# Patient Record
(demographics unavailable — no encounter records)

---

## 2024-10-17 NOTE — PHYSICAL EXAM
[Abdomen Tenderness] : ~T No ~M abdominal tenderness [JVD] : no jugular venous distention  [Normal Breath Sounds] : Normal breath sounds [No Rash or Lesion] : No rash or lesion [Alert] : alert [Calm] : calm [de-identified] : Well appearing male in NAD [de-identified] : MMM [de-identified] : ROM WNL [FreeTextEntry1] : The pt was examined in the left lateral decubitus position with a medical assistant present for the entirety of the examination. Visual examination of the anal verge with effacement of the buttocks revealed a well healed external RP scar at the AV and soft LL external pile without active thrombosis.  Otherwise no masses, ulcerations, fissures or skin rashes. GC/Chlamydia swab performed. Digital rectal exam revealed no palpable mass or blood with sphincter tone within normal limits. Appropriate anal squeeze and digital expulsion. A lubricated anoscope was then inserted revealing healthy appearing distal rectal mucosa and anoderm and a completely healed internal wound in the RP quadrant as well as moderately engorged, noninflamed internal hemorrhoids.  The patient tolerated the exam well.

## 2024-10-17 NOTE — PLAN
[TextEntry] : - I discussed with the patient the risks, benefits and alternatives to surgical management of symptomatic internal/external hemorrhoids and the patient wishes to proceed with a trial of RBL. Will schedule once he returns from his trip. - Will send Anusol supp to be used PRN while travelling abroad if symptoms flare.

## 2024-10-17 NOTE — HISTORY OF PRESENT ILLNESS
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 Recovered well from the fistulotomy however after recent travel developed exacerbation of his internal hemohrroids. He was last seen one month prior and was recommended supportive care. Today he presents for interval follow up and evaluation for possible RBL. He reports despite optimal supportive care and BMs he continues to have hemorrhoidal discomfort.  PMH: GERD, Anxiety Meds: Truvada (prep), Sertraline NKDA PSH: [place] teeth extraction FH: Denies CRC/IBD Cscope: Never, however had an EGD in college when there was concern for PUD which ultimately revealed evidence of GERD.

## 2024-11-07 NOTE — PHYSICAL EXAM
[Abdomen Tenderness] : ~T No ~M abdominal tenderness [JVD] : no jugular venous distention  [Normal Breath Sounds] : Normal breath sounds [No Rash or Lesion] : No rash or lesion [Alert] : alert [Calm] : calm [de-identified] : Well appearing male in NAD [de-identified] : MMM [de-identified] : ROM WNL [FreeTextEntry1] : The pt was examined in the left lateral decubitus position with a medical assistant present for the entirety of the examination. Visual examination of the anal verge with effacement of the buttocks revealed a well healed external RP scar at the AV and soft LL external pile without active thrombosis.  Otherwise no masses, ulcerations, fissures or skin rashes. Digital rectal exam revealed no palpable mass or blood with sphincter tone within normal limits. Appropriate anal squeeze and digital expulsion. A lubricated anoscope was then inserted revealing healthy appearing distal rectal mucosa and anoderm and a completely healed internal wound in the RP quadrant as well as grade II/III LL internal hemorrhoids.  The patient tolerated the exam well. I discussed with the patient the risks, benefits and alternatives to surgical management of symptomatic internal hemorrhoids and the patient wished to proceed with a trial of rubber band ligation. As such, with the anoscope in place the LL hemorrhoidal column was isolated and a rubber band successfully deployed proximal to the dentate line without difficulty.

## 2024-11-07 NOTE — PHYSICAL EXAM
[Abdomen Tenderness] : ~T No ~M abdominal tenderness [JVD] : no jugular venous distention  [Normal Breath Sounds] : Normal breath sounds [No Rash or Lesion] : No rash or lesion [Alert] : alert [Calm] : calm [de-identified] : Well appearing male in NAD [de-identified] : MMM [de-identified] : ROM WNL [FreeTextEntry1] : The pt was examined in the left lateral decubitus position with a medical assistant present for the entirety of the examination. Visual examination of the anal verge with effacement of the buttocks revealed a well healed external RP scar at the AV and soft LL external pile without active thrombosis.  Otherwise no masses, ulcerations, fissures or skin rashes. Digital rectal exam revealed no palpable mass or blood with sphincter tone within normal limits. Appropriate anal squeeze and digital expulsion. A lubricated anoscope was then inserted revealing healthy appearing distal rectal mucosa and anoderm and a completely healed internal wound in the RP quadrant as well as grade II/III LL internal hemorrhoids.  The patient tolerated the exam well. I discussed with the patient the risks, benefits and alternatives to surgical management of symptomatic internal hemorrhoids and the patient wished to proceed with a trial of rubber band ligation. As such, with the anoscope in place the LL hemorrhoidal column was isolated and a rubber band successfully deployed proximal to the dentate line without difficulty.

## 2024-11-07 NOTE — HISTORY OF PRESENT ILLNESS
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 Recovered well from the fistulotomy however after recent travel developed exacerbation of his internal hemorrhoids. He was last seen 10/17 and recommended a trial of RBL. Today he presents for follow up and possible banding. He reports flaring of the symptoms while away, predominantly on the Left.  PMH: GERD, Anxiety Meds: Truvada (prep), Sertraline NKDA PSH: [place] teeth extraction FH: Denies CRC/IBD Cscope: Never, however had an EGD in college when there was concern for PUD which ultimately revealed evidence of GERD.

## 2024-11-07 NOTE — HISTORY OF PRESENT ILLNESS
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 Recovered well from the fistulotomy however after recent travel developed exacerbation of his internal hemorrhoids. He was last seen 10/17 and recommended a trial of RBL. Today he presents for follow up and possible banding. He reports flaring of the symptoms while away, predominantly on the Left.  PMH: GERD, Anxiety Meds: Truvada (prep), Sertraline NKDA PSH: [place] teeth extraction FH: Denies CRC/IBD Cscope: Never, however had an EGD in college when there was concern for PUD which ultimately revealed evidence of GERD.

## 2024-12-03 NOTE — PLAN
[TextEntry] : - Continue bulking fiber laxative in addition to probiotic while on abx. - RTC for follow up in 2-3 months.

## 2024-12-03 NOTE — PLAN
[TextEntry] : - Continue bulking fiber laxative in addition to probiotic while on abx. - RTC for follow up in 2-3 months. none

## 2024-12-03 NOTE — ASSESSMENT
[FreeTextEntry1] : 31M with symptomatic internal/external hemorrhoids s/p RBL with improved symptomatology now with mucous leakage after first BM of the day likely due to change in BM after abx use.

## 2024-12-03 NOTE — HISTORY OF PRESENT ILLNESS
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 Recovered well from the fistulotomy however after recent travel developed exacerbation of his internal hemorrhoids. Seen 10/17 and recommended a trial of RBL. Last seen 11/07 s/p RBL of the LL internal hemorrhoidal column. Presents today for follow up. Reports symptoms has improved but now feels as though he is unable to perform proper perineal hygiene. States will have a full BM in the morning but will see slight soilage in the afternoon after wiping. BM has been on the looser since starting antibiotics for skin infection.   PMH: GERD, Anxiety Meds: Truvada (prep), Sertraline NKDA PSH: [place] teeth extraction FH: Denies CRC/IBD Cscope: Never, however had an EGD in college when there was concern for PUD which ultimately revealed evidence of GERD.

## 2024-12-03 NOTE — PHYSICAL EXAM
[Abdomen Tenderness] : ~T No ~M abdominal tenderness [JVD] : no jugular venous distention  [Normal Breath Sounds] : Normal breath sounds [No Rash or Lesion] : No rash or lesion [Alert] : alert [Calm] : calm [de-identified] : Well appearing male in NAD [de-identified] : MMM [de-identified] : ROM WNL [FreeTextEntry1] : The pt was examined in the prone cecilio knife position with a medical assistant present for the entirety of the examination. Visual examination of the anal verge with effacement of the buttocks revealed a well healed external RP scar at the AV.  Otherwise no masses, ulcerations, fissures or skin rashes. Digital rectal exam revealed no palpable mass or blood with sphincter tone within normal limits. Appropriate anal squeeze and digital expulsion. A lubricated anoscope was then inserted revealing healthy appearing distal rectal mucosa and anoderm and a completely healed internal wound in the RP quadrant as well as grade I/II internal hemorrhoids.  The patient tolerated the exam well.

## 2024-12-03 NOTE — HISTORY OF PRESENT ILLNESS
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 Recovered well from the fistulotomy however after recent travel developed exacerbation of his internal hemorrhoids. Seen 10/17 and recommended a trial of RBL. Last seen 11/07 s/p RBL of the LL internal hemorrhoidal column. Presents today for follow up. Reports symptoms has improved but now feels as though he is unable to perform proper perineal hygiene. States will have a full BM in the morning but will see slight soilage in the afternoon after wiping. BM has been on the looser since starting antibiotics for skin infection.   PMH: GERD, Anxiety Meds: Truvada (prep), Sertraline NKDA PSH: [place] teeth extraction FH: Denies CRC/IBD Cscope: Never, however had an EGD in college when there was concern for PUD which ultimately revealed evidence of GERD.

## 2024-12-03 NOTE — PHYSICAL EXAM
[Abdomen Tenderness] : ~T No ~M abdominal tenderness [JVD] : no jugular venous distention  [Normal Breath Sounds] : Normal breath sounds [No Rash or Lesion] : No rash or lesion [Alert] : alert [Calm] : calm [de-identified] : Well appearing male in NAD [de-identified] : MMM [de-identified] : ROM WNL [FreeTextEntry1] : The pt was examined in the prone cecilio knife position with a medical assistant present for the entirety of the examination. Visual examination of the anal verge with effacement of the buttocks revealed a well healed external RP scar at the AV.  Otherwise no masses, ulcerations, fissures or skin rashes. Digital rectal exam revealed no palpable mass or blood with sphincter tone within normal limits. Appropriate anal squeeze and digital expulsion. A lubricated anoscope was then inserted revealing healthy appearing distal rectal mucosa and anoderm and a completely healed internal wound in the RP quadrant as well as grade I/II internal hemorrhoids.  The patient tolerated the exam well.

## 2025-01-03 NOTE — HISTORY OF PRESENT ILLNESS
[FreeTextEntry1] : 31-year-old male presents with post-void dribbling and penile irritation since September 2024.   He had 2 rectal procedures for fistula and then hemorrhoid banding procedure. After this noticed increased dribbling of urine after urination which is bothersome. Also having more frequency but may be due to increased water intake. Denies dysuria but feels irritation to the tip of the penis. He denies hematuria. He had urine testing which was normal in November. He had a prostate exam that was normal.   FH prostate cancer: Grandpa and 3 Uncles

## 2025-01-03 NOTE — ASSESSMENT
[FreeTextEntry1] : 31-year-old male with hx anal fistula repair and internal and external hemorrhoids presents with post-void dribbling and penile irritation.   Discussed patient likely has a component of pelvic floor dysfunction which is consistent with history of colorectal issues as well and would likely benefit from PFPT. Patient would like to try. Discussed urethral milking. Will send urine to r/o STI and UCx.   - F/u UA, UCx, GC/CL, ureaplasma, trichomonas - PFPT - RTC 3 months

## 2025-01-03 NOTE — PHYSICAL EXAM
[General Appearance - In No Acute Distress] : no acute distress [Heart Rate And Rhythm] : heart rate and rhythm were normal [Normal Appearance] : normal appearance [Well Groomed] : well groomed [Edema] : no peripheral edema [Respiration, Rhythm And Depth] : normal respiratory rhythm and effort [Abdomen Soft] : soft [Exaggerated Use Of Accessory Muscles For Inspiration] : no accessory muscle use [Abdomen Tenderness] : non-tender [Costovertebral Angle Tenderness] : no ~M costovertebral angle tenderness [Urethral Meatus] : meatus normal [Testes Tenderness] : no tenderness of the testes [Testes Mass (___cm)] : there were no testicular masses [Normal Station and Gait] : the gait and station were normal for the patient's age [] : no rash [No Focal Deficits] : no focal deficits [Oriented To Time, Place, And Person] : oriented to person, place, and time [Affect] : the affect was normal [Mood] : the mood was normal

## 2025-01-03 NOTE — LETTER BODY
[Dear  ___] : Dear  [unfilled], [Courtesy Letter:] : I had the pleasure of seeing your patient, [unfilled], in my office today. [Please see my note below.] : Please see my note below. [Consult Closing:] : Thank you very much for allowing me to participate in the care of this patient.  If you have any questions, please do not hesitate to contact me. [Sincerely,] : Sincerely, [FreeTextEntry3] : Donta Rodriguez MD

## 2025-01-14 NOTE — ASSESSMENT
[FreeTextEntry1] : 31M with hx of RP fistula s/p primary fistulotomy and prior symptomatic internal/external hemorrhoids currently with discomfort and streaked blood of unclear etiology. Small anterior wound treated today with silver nitrate and without tracking to suggest fistula and not a typical appearance of a fissure.  4

## 2025-01-14 NOTE — PHYSICAL EXAM
[Abdomen Tenderness] : ~T No ~M abdominal tenderness [JVD] : no jugular venous distention  [Normal Breath Sounds] : Normal breath sounds [No Rash or Lesion] : No rash or lesion [Alert] : alert [Calm] : calm [de-identified] : Well appearing male in NAD [de-identified] : MMM [de-identified] : ROM WNL [FreeTextEntry1] : The pt was examined in the left lateral decubitus position with a medical assistant present for the entirety of the examination. Visual examination of the anal verge with effacement of the buttocks revealed a well healed external RP scar at the AV.  Also a 1mm LA anal verge punctate wound. The wound was probed with a fistulotomy probe and did not track anywhere. Applied silver nitrate to the wound. Otherwise no masses, ulcerations, fissures or skin rashes. Digital rectal exam revealed no palpable mass or blood with sphincter tone within normal limits. Appropriate anal squeeze and digital expulsion. A lubricated anoscope was then inserted revealing healthy appearing distal rectal mucosa and anoderm and a completely healed internal wound in the RP quadrant as well as grade I/II internal hemorrhoids.  The patient tolerated the exam well.

## 2025-01-14 NOTE — PHYSICAL EXAM
[Abdomen Tenderness] : ~T No ~M abdominal tenderness [JVD] : no jugular venous distention  [Normal Breath Sounds] : Normal breath sounds [No Rash or Lesion] : No rash or lesion [Alert] : alert [Calm] : calm [de-identified] : Well appearing male in NAD [de-identified] : MMM [de-identified] : ROM WNL [FreeTextEntry1] : The pt was examined in the left lateral decubitus position with a medical assistant present for the entirety of the examination. Visual examination of the anal verge with effacement of the buttocks revealed a well healed external RP scar at the AV.  Also a 1mm LA anal verge punctate wound. The wound was probed with a fistulotomy probe and did not track anywhere. Applied silver nitrate to the wound. Otherwise no masses, ulcerations, fissures or skin rashes. Digital rectal exam revealed no palpable mass or blood with sphincter tone within normal limits. Appropriate anal squeeze and digital expulsion. A lubricated anoscope was then inserted revealing healthy appearing distal rectal mucosa and anoderm and a completely healed internal wound in the RP quadrant as well as grade I/II internal hemorrhoids.  The patient tolerated the exam well.

## 2025-01-14 NOTE — ASSESSMENT
[FreeTextEntry1] : 31M with hx of RP fistula s/p primary fistulotomy and prior symptomatic internal/external hemorrhoids currently with discomfort and streaked blood of unclear etiology. Small anterior wound treated today with silver nitrate and without tracking to suggest fistula and not a typical appearance of a fissure.

## 2025-01-14 NOTE — HISTORY OF PRESENT ILLNESS
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 Recovered well from the fistulotomy however after recent travel developed exacerbation of his internal hemorrhoids. Last seen early December for interval follow up. s/p RBL with improved symptomatology but now feel as though he is unable to perform proper perineal hygiene. BM has been on the looser since starting antibiotics for skin infection. Now with mucous leakage after first BM of the day likely due to change in BM after abx use. Advised continue bulking fiber laxative in addition to probiotic while on antibiotics. Patient presents today for interval follow up. Has been on probiotics and increased fiber intake. Has been doing well until about two weeks ago when he developed constipation during the holidays. Reports an increased swelling of lump near the anal opening with rectal discomfort. Has been applying desitin cream and performing sitz bath. BM is now regulated.   PMH: GERD, Anxiety Meds: Truvada (prep), Sertraline NKDA PSH: [place] teeth extraction FH: Denies CRC/IBD Cscope: Never, however had an EGD in college when there was concern for PUD which ultimately revealed evidence of GERD.

## 2025-01-14 NOTE — HISTORY OF PRESENT ILLNESS
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 Recovered well from the fistulotomy however after recent travel developed exacerbation of his internal hemorrhoids. Last seen early December for interval follow up. s/p RBL with improved symptomatology but now feel as though he is unable to perform proper perineal hygiene. BM has been on the looser since starting antibiotics for skin infection. Now with mucous leakage after first BM of the day likely due to change in BM after abx use. Advised continue bulking fiber laxative in addition to probiotic while on antibiotics. Patient presents today for interval follow up. Has been on probiotics and increased fiber intake. Has been doing well until about two weeks ago when he developed constipation during the holidays. Reports an increased swelling of lump near the anal opening with rectal discomfort. Has been applying desitin cream and performing sitz bath. BM is now regulated.   PMH: GERD, Anxiety Meds: Truvada (prep), Sertraline NKDA PSH: [place] teeth extraction FH: Denies CRC/IBD Cscope: Never, however had an EGD in college when there was concern for PUD which ultimately revealed evidence of GERD.

## 2025-01-28 NOTE — ASSESSMENT
[FreeTextEntry1] : 32M with hx of RP fistula s/p primary fistulotomy and prior symptomatic internal/external hemorrhoids recently with a small anal verge wound concerning for possible new or recurrent fistula, treated topically now with resolution.

## 2025-01-28 NOTE — HISTORY OF PRESENT ILLNESS
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 Seen 12/2024 s/p RBL RPQ.  Seen two week prior with 1mm LA anal verge punctate wound, treated with silver nitrate. Recommended barrier ointment. Patient returns today, has bene using Vaseline daily and feel wound as completely closed. He has not had pain or drainage. Of note, two weeks ago he developed a prolapsing internal hemorrhoid, he has since increased stool softeners and fiber supplementation and symptoms are manageable at this time.   PMH: GERD, Anxiety Meds: Truvada (prep), Sertraline NKDA PSH: [place] teeth extraction FH: Denies CRC/IBD Cscope: Never, however had an EGD in college when there was concern for PUD which ultimately revealed evidence of GERD.

## 2025-01-28 NOTE — PHYSICAL EXAM
[Abdomen Tenderness] : ~T No ~M abdominal tenderness [JVD] : no jugular venous distention  [Normal Breath Sounds] : Normal breath sounds [No Rash or Lesion] : No rash or lesion [Alert] : alert [Calm] : calm [de-identified] : Well appearing male in NAD [de-identified] : MMM [de-identified] : ROM WNL [FreeTextEntry1] : The pt was examined in the left lateral decubitus position with a medical assistant present for the entirety of the examination. Visual examination of the anal verge with effacement of the buttocks revealed a well healed external RP scar at the AV.  Also a 1mm LA anal verge well healed scar where the prior wound had been noted. Otherwise no masses, ulcerations, fissures or skin rashes.

## 2025-03-12 NOTE — CONSULT LETTER
[Dear  ___] : Dear  [unfilled], [Consult Letter:] : I had the pleasure of evaluating your patient, [unfilled]. [Please see my note below.] : Please see my note below. [Consult Closing:] : Thank you very much for allowing me to participate in the care of this patient.  If you have any questions, please do not hesitate to contact me. [Sincerely,] : Sincerely, [FreeTextEntry3] : Nimisha Marques MD [facility] at [facility] Otolaryngology  Head & Neck Surgery [facility]   Department of Otolaryngology [facility] [employer] [facility] at [facility]  Office Tel: [phone]

## 2025-03-12 NOTE — HISTORY OF PRESENT ILLNESS
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 Seen in interim with sympomatic hemorrhoids s/p RBL RAQ. Last seen in January with with LA anal verge punctate wound concerning for new or recurrent fistula, treated with silver nitrate and now with resolution. He returns today concerned about new anal pain. He was at pelvic floor PT on Thursday and says that while the PT was removing her finger from his rectum, he felt a sharp pain and had some bleeding afterwards. Since then, he has had R anal pain and occasional bleeding. Has not tried any topical therapies.  PMH: GERD, Anxiety Meds: Truvada (prep), Sertraline NKDA PSH: [place] teeth extraction FH: Denies CRC/IBD Cscope: Never, however had an EGD in college when there was concern for PUD which ultimately revealed evidence of GERD.

## 2025-03-12 NOTE — ASSESSMENT
[FreeTextEntry1] : He has lingering symptoms following an upper respiratory tract infection.  There is no obvious sinus infection on exam.  He does have areas of recent bleeding on the anterior nasal septum bilaterally.  The septum was deviated.  He also suffers from reflux.  His symptoms are likely due to a resolving infection and reflux exacerbation.  Throat culture was sent to rule out bacterial infection.  He may also suffer from allergies  Plan -Findings and management options were discussed with the patient. - Humidifier - Nasal saline rinses and moisturizing nasal gel as needed - Continue Flonase although he may stop it for few days if there is bleeding - He should avoid picking at crusting in the nose - Antihistamine as needed - He is asked to call for the culture results.  I will place him on antibiotics if it is positive - Reflux precautions recommended.  He was given literature - Pepcid 20 mg twice daily - Consider further workup such as allergy evaluation depending on how he does - I have asked him to call me if he continues to have nosebleeds.  We will cauterize the septum if needed -avoid smoking and vaping - Follow-up in 2 to 3 weeks - Call and return earlier if any problems or worsening symptoms

## 2025-03-12 NOTE — PHYSICAL EXAM
[Abdomen Tenderness] : ~T No ~M abdominal tenderness [JVD] : no jugular venous distention  [Normal Breath Sounds] : Normal breath sounds [No Rash or Lesion] : No rash or lesion [Alert] : alert [Calm] : calm [de-identified] : Well appearing male in NAD [de-identified] : MMM [de-identified] : ROM WNL [FreeTextEntry1] : The pt was examined in the prone cecilio-knife position with a medical assistant present for the entirety of the examination. Visual examination of the anal verge with effacement of the buttocks revealed well healed scars in the RP and LA anal verge otherwise no masses, ulcerations, fissures or skin rashes. Digital rectal exam revealed no palpable mass or blood with sphincter tone within normal limits. A lubricated anoscope was then inserted revealing healthy appearing distal rectal mucosa and anoderm with appropriately sized, noninflamed internal hemorrhoids.  The patient tolerated the exam well.

## 2025-03-12 NOTE — HISTORY OF PRESENT ILLNESS
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 Seen in interim with sympomatic hemorrhoids s/p RBL RAQ. Last seen in January with with LA anal verge punctate wound concerning for new or recurrent fistula, treated with silver nitrate and now with resolution. He returns today concerned about new anal pain. He was at pelvic floor PT on Thursday and says that while the PT was removing her finger from his rectum, he felt a sharp pain and had some bleeding afterwards. Since then, he has had R anal pain and occasional bleeding. Has not tried any topical therapies.  PMH: GERD, Anxiety Meds: Truvada (prep), Sertraline NKDA PSH: [place] teeth extraction FH: Denies CRC/IBD Cscope: Never, however had an EGD in college when there was concern for PUD which ultimately revealed evidence of GERD.

## 2025-03-12 NOTE — PHYSICAL EXAM
[TextEntry] : PHYSICAL EXAM  General: The patient was alert, oriented and in no distress. Voice was clear.  Face: The patient had no facial asymmetry or mass. The skin was unremarkable.  Ears: Hearing normal to conversational voice External ears were normal without deformity. Ear canals were clear. No cerumen or inflammation Tympanic membranes were intact and normal. No perforation or effusion. mobile  Nose:  The external nose had no significant deformity.  No facial tenderness . On anterior rhinoscopy, the nasal mucosa was little dry anteriorly.  The anterior septum was grossly midline with dried blood on the anterior septum. There were no visualized polyps, purulence  or masses.   Oral cavity: Oral mucosa- normal. Oral and base of tongue- clear and without mass. Gingival and buccal mucosa- moist and without lesions. Palate- the palate moved well. There was no cleft palate. There appeared to be good salivary flow.   Oral cavity/oropharynx- no pus, erythema or mass 2-3+ tonsils.  No erythema, exudate, or abscess.  Culture taken  Neck:  The neck was symmetrical. The parotid and submandibular glands were normal without masses. The trachea was midline and there was no unusual crepitus. Thyroid was smooth and nontender and no masses were palpated. No masses  Lymphatics: Cervical adenopathy- none.    PROCEDURE:  FLEXIBLE LARYNGOSCOPY, NASAL ENDOSCOPY   Surgeon: Dr. Marques Indication: Chronic rhinitis, bloody mucus, recent upper respiratory tract infection, assess for sinusitis, inadequate exam on anterior rhinoscopy Anesthetic: Topical lidocaine and Afrin Procedure: The patient was placed in a sitting position.  Following application of the topical anesthetic and decongestant, exam was performed with a flexible telescope.  The scope was passed along the right nasal floor to the nasopharynx.  It was then passed into the region of the middle meatus, middle turbinate, and sphenoethmoid region.  An identical procedure was performed on the left side.  The following findings were noted:  Nasal mucosa: Dry anteriorly with crusting and blood on the anterior nasal septum bilaterally.  Otherwise mild edema Septum: Severely deviated to the left  Right nasal cavity      Inferior turbinate: Hypertrophic      Middle turbinate: normal      Superior turbinate: normal      Inferior meatus: no pus, polyps or congestion      Middle meatus:  no pus, polyps or congestion       Superior meatus:  no pus, polyps, or congestion      Sphenoethmoidal recess: no pus, polyps or congestion   Left nasal cavity      Inferior turbinate: Hypertrophic      Middle turbinate: normal      Superior turbinate: normal      Inferior meatus: no pus, polyps or congestion      Middle meatus: no pus, polyps, or congestion      Superior meatus:  no pus, polyps, or congestion      Sphenoethmoidal recess: no pus, polyps or congestion   Nasopharynx: no masses, choanae patent, no adenoid tissue  Base of tongue and vallecula: no masses or asymmetry Posterior pharyngeal wall: no masses.  Hypopharynx: symmetrical. No masses Pyriform sinuses: no lesions or pooling of secretions Epiglottis: normal. No edema or lesions Aryepiglottic folds: normal. No lesions.  True vocal cords: clear and mobile. No lesions. Airway patent False vocal cords: normal Ventricles: no masses.  Arytenoids: Normal Interarytenoid area: no masses.  Normal Subglottis: normal. no masses

## 2025-03-12 NOTE — PHYSICAL EXAM
[Abdomen Tenderness] : ~T No ~M abdominal tenderness [JVD] : no jugular venous distention  [Normal Breath Sounds] : Normal breath sounds [No Rash or Lesion] : No rash or lesion [Alert] : alert [Calm] : calm [de-identified] : Well appearing male in NAD [de-identified] : MMM [de-identified] : ROM WNL [FreeTextEntry1] : The pt was examined in the prone cecilio-knife position with a medical assistant present for the entirety of the examination. Visual examination of the anal verge with effacement of the buttocks revealed well healed scars in the RP and LA anal verge otherwise no masses, ulcerations, fissures or skin rashes. Digital rectal exam revealed no palpable mass or blood with sphincter tone within normal limits. A lubricated anoscope was then inserted revealing healthy appearing distal rectal mucosa and anoderm with appropriately sized, noninflamed internal hemorrhoids.  The patient tolerated the exam well.

## 2025-03-12 NOTE — ASSESSMENT
[FreeTextEntry1] : 32M with a hx of complicated fistula s/p repair now with pelvic floor discomfort, currently undergoing pelvic manipulation and physiotherapy.

## 2025-03-12 NOTE — HISTORY OF PRESENT ILLNESS
[de-identified] : AUDRA LOPEZ is a 32 year old patient here for lingering symptoms following a URI which started in January. Overall he improved but still has nasal congestion, PND, nasal drainage, bloody mucous, throat clearing, throat pain, voice change and globus sensation.  He was on clindamycin in January for a nasal skin infection. He has been using flonase and allegra  possible allergies. no testing no recurrent sinus infections no nasal/sinus trauma or surgery he is occasionally around a cat he has reflux. no medication he is a former smoker he vapes marijuana 1-2 times a month  He has TMJD and uses a nightguard

## 2025-03-24 NOTE — PHYSICAL EXAM
[TextEntry] : PHYSICAL EXAM  General: The patient was alert, oriented and in no distress. Voice was clear.  Face: The patient had no facial asymmetry or mass. The skin was unremarkable.  Ears: Hearing normal to conversational voice External ears were normal without deformity. Ear canals were clear. No cerumen or inflammation Tympanic membranes were intact and normal. No perforation or effusion. mobile  Nose:  The external nose had no significant deformity.  No facial tenderness . On anterior rhinoscopy, the nasal mucosa was clear.  The anterior septum was grossly midline. There were no visualized polyps, purulence  or masses.  no clots, scabs or evidence of recent bleeding  Oral cavity: Oral mucosa- normal. Oral and base of tongue- clear and without mass. Gingival and buccal mucosa- moist and without lesions. Palate- the palate moved well. There was no cleft palate. There appeared to be good salivary flow.   Oral cavity/oropharynx- no pus, erythema or mass 2-3+ tonsils.  No erythema, exudate, or abscess.  Culture taken  Neck:  The neck was symmetrical. The parotid and submandibular glands were normal without masses. The trachea was midline and there was no unusual crepitus. Thyroid was smooth and nontender and no masses were palpated. No masses  Lymphatics: Cervical adenopathy- none.

## 2025-03-24 NOTE — CONSULT LETTER
[Dear  ___] : Dear  [unfilled], [Courtesy Letter:] : I had the pleasure of seeing your patient, [unfilled], in my office today. [Please see my note below.] : Please see my note below. [Consult Closing:] : Thank you very much for allowing me to participate in the care of this patient.  If you have any questions, please do not hesitate to contact me. [Sincerely,] : Sincerely, [FreeTextEntry3] : Nimisha Marques MD [facility] at [facility] Ot[facility]ology  Head & Neck Surgery [facility]   Department of Otolaryngology [facility] [employer] [facility] at [facility]  Office Tel: [phone]

## 2025-03-24 NOTE — HISTORY OF PRESENT ILLNESS
[de-identified] : AUDRA LOPEZ is a 32 year old patient  Here for follow-up.  He has had lingering symptoms following an upper respiratory tract infection which he had in January.  He still has nasal congestion, postnasal drip, nasal drainage, throat clearing, throat discomfort, and globus sensation.  On exam at his last visit, he had a little bit of dry nasal mucosa and crusting anteriorly and a severely deviated septum.  His exam was otherwise unremarkable.  There is no obvious sinus infection.  A throat culture was sent which grew a few bacteria including Staph aureus and Klebsiella originates.  Both bacteria were resistant to some antibiotics.  He spoke with his PCP and opted against taking another course of antibiotics.  He had taken clindamycin prior to getting sick for a skin infection.  He has been using Flonase and an antihistamine.  We also had discussed the possibility of reflux.  He has been on medication for that.  His symptoms have not changed .  He had a small episode of blood from the nose but the bleeding has improved   possible allergies. no testing no recurrent sinus infections no nasal/sinus trauma or surgery he is occasionally around a cat he has reflux. no medication he is a former smoker he vapes marijuana 1-2 times a month  He has TMJD and uses a nightguard

## 2025-03-24 NOTE — ASSESSMENT
[FreeTextEntry1] :  He continues to have lingering symptoms.  We have reviewed his culture results.  A repeat culture was taken.  We will again discuss antibiotics if it is positive.  The bleeding from the septum and nasal dryness have improved.  Plan -Findings and management options were discussed with the patient. - Continue nasal saline rinses and moisturizing nasal gel as needed - Nasal steroid spray and antihistamine as needed - He was again asked to call for the culture results - Continue management of reflux - Allergy evaluation recommended - I will speak with him when he calls for the culture results.  Otherwise, I will see him back after the allergy evaluation - Call and return earlier if any problems or worsening symptoms

## 2025-07-29 NOTE — HISTORY OF PRESENT ILLNESS
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 Seen in interim with symptomatic hemorrhoids s/p RBL RA quadrant. Last seen in March with pelvic floor discomfort and recommended continued treatment with pelvic manipulation and physiotherapy. Today he presents with c/o hemorrhoidal discomfort (rectal prssure) and swelling after bout of constipation/traveling. Denies any bleeding. States he started on colace, Fiber and Magnesium with improvement in BMs. Currently having BMs daily but can change depending on diet.  Today reports noting some external tissue present that was the source of discomfort when it was flared up. Patient has a bidet so denies any issues with perianal hygiene. Has been using desitin in the area, last time used was 1 week ago. Currently has colonscopy scheduled for 08/11/2025, Dr. Muñoz  PMH: GERD, Anxiety Meds: Truvada (prep), Sertraline NKDA PSH: [place] teeth extraction FH: Denies CRC/IBD Cscope: Never, however had an EGD in college when there was concern for PUD which ultimately revealed evidence of GERD.

## 2025-07-29 NOTE — PHYSICAL EXAM
[Normal Breath Sounds] : Normal breath sounds [No Rash or Lesion] : No rash or lesion [Alert] : alert [Calm] : calm [Abdomen Tenderness] : ~T No ~M abdominal tenderness [JVD] : no jugular venous distention  [de-identified] : Well appearing male in NAD [de-identified] : MMM [de-identified] : ROM WNL [FreeTextEntry1] : The pt was examined in the prone cecilio-knife position with a medical assistant present for the entirety of the examination. Visual examination of the anal verge with effacement of the buttocks revealed well healed scar in the RP and a small punctate wound of the LA anal margin in a similar location as before. The area was probed with a small fistulotomy probe and tracked towards the sphincter about 1cm but no further. No drainable purulence or fluctuance. The wound was treated with silver nitrate. Digital rectal exam revealed no palpable mass or blood with sphincter tone within normal limits. A lubricated anoscope was then inserted revealing healthy appearing distal rectal mucosa and anoderm with appropriately sized, noninflamed internal hemorrhoids.  The patient tolerated the exam well.

## 2025-07-29 NOTE — HISTORY OF PRESENT ILLNESS
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 Seen in interim with symptomatic hemorrhoids s/p RBL RA quadrant. Last seen in March with pelvic floor discomfort and recommended continued treatment with pelvic manipulation and physiotherapy. Today he presents with c/o hemorrhoidal discomfort (rectal prssure) and swelling after bout of constipation/traveling. Denies any bleeding. States he started on colace, Fiber and Magnesium with improvement in BMs. Currently having BMs daily but can change depending on diet.  Today reports noting some external tissue present that was the source of discomfort when it was flared up. Patient has a bidet so denies any issues with perianal hygiene. Has been using desitin in the area, last time used was 1 week ago. Currently has colonscopy scheduled for 08/11/2025, Dr. Muñoz  PMH: GERD, Anxiety Meds: Truvada (prep), Sertraline NKDA PSH: [place] teeth extraction FH: Denies CRC/IBD Cscope: Never, however had an EGD in college when there was concern for PUD which ultimately revealed evidence of GERD.

## 2025-07-29 NOTE — ASSESSMENT
[FreeTextEntry1] : 32M with prior RP fistula s/p fistulotomy and subsequent symptomatic internal/external hemorrhoids treated with RBL. Currently with a small reopening of a LA anal verge wound. Treated with silver nitrate and recommended topical barrier ointment.

## 2025-07-29 NOTE — PHYSICAL EXAM
[Normal Breath Sounds] : Normal breath sounds [No Rash or Lesion] : No rash or lesion [Alert] : alert [Calm] : calm [Abdomen Tenderness] : ~T No ~M abdominal tenderness [JVD] : no jugular venous distention  [de-identified] : Well appearing male in NAD [de-identified] : MMM [de-identified] : ROM WNL [FreeTextEntry1] : The pt was examined in the prone cecilio-knife position with a medical assistant present for the entirety of the examination. Visual examination of the anal verge with effacement of the buttocks revealed well healed scar in the RP and a small punctate wound of the LA anal margin in a similar location as before. The area was probed with a small fistulotomy probe and tracked towards the sphincter about 1cm but no further. No drainable purulence or fluctuance. The wound was treated with silver nitrate. Digital rectal exam revealed no palpable mass or blood with sphincter tone within normal limits. A lubricated anoscope was then inserted revealing healthy appearing distal rectal mucosa and anoderm with appropriately sized, noninflamed internal hemorrhoids.  The patient tolerated the exam well.